# Patient Record
Sex: MALE | Race: WHITE | NOT HISPANIC OR LATINO | URBAN - METROPOLITAN AREA
[De-identification: names, ages, dates, MRNs, and addresses within clinical notes are randomized per-mention and may not be internally consistent; named-entity substitution may affect disease eponyms.]

---

## 2023-08-20 ENCOUNTER — HOSPITAL ENCOUNTER (OUTPATIENT)
Dept: DATA CONVERSION | Facility: HOSPITAL | Age: 53
Discharge: HOME | End: 2023-08-20

## 2023-08-20 DIAGNOSIS — R22.43 LOCALIZED SWELLING, MASS AND LUMP, LOWER LIMB, BILATERAL: ICD-10-CM

## 2023-08-20 DIAGNOSIS — M79.661 PAIN IN RIGHT LOWER LEG: ICD-10-CM

## 2023-08-20 DIAGNOSIS — M79.662 PAIN IN LEFT LOWER LEG: ICD-10-CM

## 2023-08-20 LAB
ALBUMIN SERPL-MCNC: 3.7 GM/DL (ref 3.5–5)
ALBUMIN/GLOB SERPL: 1.4 RATIO (ref 1.5–3)
ALP BLD-CCNC: 62 U/L (ref 35–125)
ALT SERPL-CCNC: 36 U/L (ref 5–40)
ANION GAP SERPL CALCULATED.3IONS-SCNC: 11 MMOL/L (ref 0–19)
AST SERPL-CCNC: 28 U/L (ref 5–40)
BASOPHILS # BLD AUTO: 0.04 K/UL (ref 0–0.22)
BASOPHILS NFR BLD AUTO: 0.7 % (ref 0–1)
BILIRUB SERPL-MCNC: 0.2 MG/DL (ref 0.1–1.2)
BUN SERPL-MCNC: 20 MG/DL (ref 8–25)
BUN/CREAT SERPL: 20 RATIO (ref 8–21)
CALCIUM SERPL-MCNC: 8.7 MG/DL (ref 8.5–10.4)
CHLORIDE SERPL-SCNC: 104 MMOL/L (ref 97–107)
CO2 SERPL-SCNC: 22 MMOL/L (ref 24–31)
CREAT SERPL-MCNC: 1 MG/DL (ref 0.4–1.6)
DEPRECATED RDW RBC AUTO: 41.8 FL (ref 37–54)
DIFFERENTIAL METHOD BLD: ABNORMAL
EOSINOPHIL # BLD AUTO: 0.18 K/UL (ref 0–0.45)
EOSINOPHIL NFR BLD: 2.9 % (ref 0–3)
ERYTHROCYTE [DISTWIDTH] IN BLOOD BY AUTOMATED COUNT: 13 % (ref 11.7–15)
GFR SERPL CREATININE-BSD FRML MDRD: 91 ML/MIN/1.73 M2
GLOBULIN SER-MCNC: 2.6 G/DL (ref 1.9–3.7)
GLUCOSE SERPL-MCNC: 132 MG/DL (ref 65–99)
HCT VFR BLD AUTO: 39.6 % (ref 41–50)
HGB BLD-MCNC: 13 GM/DL (ref 13.5–16.5)
HS TROPONIN T DELTA: NORMAL (ref 0–4)
IMM GRANULOCYTES # BLD AUTO: 0.08 K/UL (ref 0–0.1)
LYMPHOCYTES # BLD AUTO: 2.28 K/UL (ref 1.2–3.2)
LYMPHOCYTES NFR BLD MANUAL: 37.2 % (ref 20–40)
MCH RBC QN AUTO: 28.9 PG (ref 26–34)
MCHC RBC AUTO-ENTMCNC: 32.8 % (ref 31–37)
MCV RBC AUTO: 88 FL (ref 80–100)
MONOCYTES # BLD AUTO: 0.52 K/UL (ref 0–0.8)
MONOCYTES NFR BLD MANUAL: 8.5 % (ref 0–8)
NEUTROPHILS # BLD AUTO: 3.03 K/UL
NEUTROPHILS # BLD AUTO: 3.03 K/UL (ref 1.8–7.7)
NEUTROPHILS.IMMATURE NFR BLD: 1.3 % (ref 0–1)
NEUTS SEG NFR BLD: 49.4 % (ref 50–70)
NRBC BLD-RTO: 0 /100 WBC
NT-PROBNP SERPL-MCNC: 92 PG/ML (ref 0–138)
PLATELET # BLD AUTO: 264 K/UL (ref 150–450)
PMV BLD AUTO: 9.7 CU (ref 7–12.6)
POTASSIUM SERPL-SCNC: 4.2 MMOL/L (ref 3.4–5.1)
PROT SERPL-MCNC: 6.3 G/DL (ref 5.9–7.9)
RBC # BLD AUTO: 4.5 M/UL (ref 4.5–5.5)
SODIUM SERPL-SCNC: 137 MMOL/L (ref 133–145)
TROPONIN T SERPL-MCNC: 9 NG/L
WBC # BLD AUTO: 6.1 K/UL (ref 4.5–11)

## 2024-09-29 ENCOUNTER — OFFICE VISIT (OUTPATIENT)
Dept: URGENT CARE | Age: 54
End: 2024-09-29
Payer: MEDICARE

## 2024-09-29 VITALS
DIASTOLIC BLOOD PRESSURE: 88 MMHG | RESPIRATION RATE: 16 BRPM | SYSTOLIC BLOOD PRESSURE: 129 MMHG | HEART RATE: 83 BPM | TEMPERATURE: 97.7 F | OXYGEN SATURATION: 96 %

## 2024-09-29 DIAGNOSIS — J06.9 VIRAL URI: Primary | ICD-10-CM

## 2024-09-29 DIAGNOSIS — J01.00 ACUTE MAXILLARY SINUSITIS, RECURRENCE NOT SPECIFIED: ICD-10-CM

## 2024-09-29 DIAGNOSIS — R53.83 NO ENERGY: ICD-10-CM

## 2024-09-29 LAB — POC FINGERSTICK BLOOD GLUCOSE: 114 MG/DL (ref 70–100)

## 2024-09-29 RX ORDER — AMOXICILLIN 875 MG/1
875 TABLET, FILM COATED ORAL 2 TIMES DAILY
Qty: 14 TABLET | Refills: 0 | Status: SHIPPED | OUTPATIENT
Start: 2024-09-29 | End: 2024-10-06

## 2024-09-29 NOTE — PATIENT INSTRUCTIONS
You have maxillary sinusitis. This can be a bacterial or viral infection. Based on your history and the clinical exam I am treating this as a bacterial infection.  Please increase your oral fluids for the next 7-10 days  Please take amoxicillin As prescribed  May use Mucinex as per label instructions for nasal congestion  You may use nasal saline drops for relief of congestion as needed  May take Tylenol (acetaminophen) 325 mg, 2 tablets by mouth every 4-6 hours as needed for fever or discomfort.   May take Motrin or Advil (ibuprofen) 200 mg, 2 tablets by mouth every 8 hours as needed for fever   If no improvement in 5-7 days please follow-up with your primary care provider  If fever greater than 102 degrees Fahrenheit, chills, nausea, vomiting, increased redness, tenderness, pain over for head or cheek bone areas, bloody nasal discharge, increased difficulty breathing, difficulty swallowing, increased wheezing, shortness of breath please go immediately to emergency room for further evaluation  This note was generated by voice recognition software. Minor transcription/grammatical errors may be present. Please call for clarification.

## 2024-09-29 NOTE — PROGRESS NOTES
"Subjective   Patient ID: Kt Weaver is a 54 y.o. male.    54-year-old male presents with a 4-day history of feeling tired with no energy increased fatigue also nausea that \"comes and goes\" and with sweats and chills.    Reports he is a newly diagnosed type II diabetic with bipolar disorder.      History provided by:  Patient   used: No        The following portions of the chart were reviewed this encounter and updated as appropriate:  Allergies  Meds  Problems  Med Hx  Surg Hx  Fam Hx         Review of Systems   Constitutional:  Positive for chills. Negative for fever.   HENT:  Positive for congestion, sinus pressure and sore throat. Negative for ear pain and rhinorrhea.    Respiratory:  Positive for cough, shortness of breath and wheezing. Negative for chest tightness.    Cardiovascular:  Negative for palpitations.   Gastrointestinal:  Positive for constipation and nausea. Negative for abdominal pain, diarrhea and vomiting.   Genitourinary:  Negative for dysuria and frequency.   Neurological:  Positive for headaches.   Hematological:  Negative for adenopathy.     Objective   Vital signs are reviewed. Alert and oriented x3 with normal mood and affect  Patient is well nourished, well-developed, alert and in no acute distress  Tender to palpation over maxillary sinus areas    External eyes, orbits, conjunctiva and eyelids are normal in appearance  Pupils are equal, round, reactive to light and accommodation, extraocular movements intact    External ears appear normal  External canals are normal in appearance  Right tympanic membrane is intact and pale gray in appearance  Left tympanic membrane is intact and pale gray in appearance  There is no middle ear effusion noted on the right  There is no middle ear effusion noted on the left  External appearance of the nose is normal  Nasal mucosa, septum, turbinates are moderately swollen and reddened in appearance  There is thick yellow nasal " discharge in both nares    Oral mucosa is uniformly pink and moist  Palate is pink, symmetric and intact  Tongue is moist, mobile and midline  Posterior pharynx moderately erythematous with no concretions or exudates present  No cervical lymphadenopathy palpated    Heart has regular rate and rhythm. No murmurs, rubs or gallops are auscultated at this exam.    Respiratory rate rhythm and effort are normal. Breath sounds coarse are clear on auscultation without crackles, rhonchi, wheezes or friction rub.    Abdomen: Normal bowel sounds on auscultation. Soft, nontender without rebound or rigidity on palpation          Procedures    Assessment/Plan   Diagnoses and all orders for this visit:  Viral URI  No energy  -     POCT fingerstick glucose manually resulted  Acute maxillary sinusitis, recurrence not specified  -     amoxicillin (Amoxil) 875 mg tablet; Take 1 tablet (875 mg) by mouth 2 times a day for 7 days.    Patient disposition: Home

## 2024-10-10 ASSESSMENT — ENCOUNTER SYMPTOMS
VOMITING: 0
SHORTNESS OF BREATH: 1
CHILLS: 1
ABDOMINAL PAIN: 0
CHEST TIGHTNESS: 0
CONSTIPATION: 1
FREQUENCY: 0
FEVER: 0
SINUS PRESSURE: 1
NAUSEA: 1
HEADACHES: 1
COUGH: 1
DIARRHEA: 0
WHEEZING: 1
RHINORRHEA: 0
ADENOPATHY: 0
PALPITATIONS: 0
SORE THROAT: 1
DYSURIA: 0

## 2024-12-18 ENCOUNTER — HOSPITAL ENCOUNTER (EMERGENCY)
Facility: HOSPITAL | Age: 54
Discharge: HOME | End: 2024-12-18
Attending: EMERGENCY MEDICINE
Payer: MEDICARE

## 2024-12-18 ENCOUNTER — CLINICAL SUPPORT (OUTPATIENT)
Dept: URGENT CARE | Age: 54
End: 2024-12-18
Payer: MEDICARE

## 2024-12-18 ENCOUNTER — APPOINTMENT (OUTPATIENT)
Dept: RADIOLOGY | Facility: HOSPITAL | Age: 54
End: 2024-12-18
Payer: MEDICARE

## 2024-12-18 VITALS
OXYGEN SATURATION: 97 % | RESPIRATION RATE: 17 BRPM | BODY MASS INDEX: 33.34 KG/M2 | HEIGHT: 68 IN | SYSTOLIC BLOOD PRESSURE: 126 MMHG | TEMPERATURE: 97.9 F | DIASTOLIC BLOOD PRESSURE: 94 MMHG | HEART RATE: 94 BPM | WEIGHT: 220 LBS

## 2024-12-18 DIAGNOSIS — R10.84 GENERALIZED ABDOMINAL PAIN: ICD-10-CM

## 2024-12-18 DIAGNOSIS — K59.00 CONSTIPATION, UNSPECIFIED CONSTIPATION TYPE: Primary | ICD-10-CM

## 2024-12-18 DIAGNOSIS — Z76.89 REFERRAL OF PATIENT: Primary | ICD-10-CM

## 2024-12-18 LAB
ALBUMIN SERPL BCP-MCNC: 4 G/DL (ref 3.4–5)
ALP SERPL-CCNC: 60 U/L (ref 33–120)
ALT SERPL W P-5'-P-CCNC: 19 U/L (ref 10–52)
ANION GAP SERPL CALCULATED.3IONS-SCNC: 10 MMOL/L (ref 10–20)
APPEARANCE UR: CLEAR
AST SERPL W P-5'-P-CCNC: 15 U/L (ref 9–39)
BASOPHILS # BLD AUTO: 0.05 X10*3/UL (ref 0–0.1)
BASOPHILS NFR BLD AUTO: 0.6 %
BILIRUB SERPL-MCNC: 0.4 MG/DL (ref 0–1.2)
BILIRUB UR STRIP.AUTO-MCNC: NEGATIVE MG/DL
BUN SERPL-MCNC: 14 MG/DL (ref 6–23)
CALCIUM SERPL-MCNC: 9.1 MG/DL (ref 8.6–10.3)
CHLORIDE SERPL-SCNC: 100 MMOL/L (ref 98–107)
CO2 SERPL-SCNC: 28 MMOL/L (ref 21–32)
COLOR UR: COLORLESS
CREAT SERPL-MCNC: 0.95 MG/DL (ref 0.5–1.3)
EGFRCR SERPLBLD CKD-EPI 2021: >90 ML/MIN/1.73M*2
EOSINOPHIL # BLD AUTO: 0.2 X10*3/UL (ref 0–0.7)
EOSINOPHIL NFR BLD AUTO: 2.6 %
ERYTHROCYTE [DISTWIDTH] IN BLOOD BY AUTOMATED COUNT: 14.7 % (ref 11.5–14.5)
GLUCOSE SERPL-MCNC: 118 MG/DL (ref 74–99)
GLUCOSE UR STRIP.AUTO-MCNC: ABNORMAL MG/DL
HCT VFR BLD AUTO: 41.5 % (ref 41–52)
HGB BLD-MCNC: 13.5 G/DL (ref 13.5–17.5)
HOLD SPECIMEN: NORMAL
IMM GRANULOCYTES # BLD AUTO: 0.05 X10*3/UL (ref 0–0.7)
IMM GRANULOCYTES NFR BLD AUTO: 0.6 % (ref 0–0.9)
KETONES UR STRIP.AUTO-MCNC: NEGATIVE MG/DL
LEUKOCYTE ESTERASE UR QL STRIP.AUTO: NEGATIVE
LIPASE SERPL-CCNC: 12 U/L (ref 9–82)
LYMPHOCYTES # BLD AUTO: 2.86 X10*3/UL (ref 1.2–4.8)
LYMPHOCYTES NFR BLD AUTO: 36.9 %
MCH RBC QN AUTO: 27.8 PG (ref 26–34)
MCHC RBC AUTO-ENTMCNC: 32.5 G/DL (ref 32–36)
MCV RBC AUTO: 86 FL (ref 80–100)
MONOCYTES # BLD AUTO: 0.71 X10*3/UL (ref 0.1–1)
MONOCYTES NFR BLD AUTO: 9.1 %
NEUTROPHILS # BLD AUTO: 3.89 X10*3/UL (ref 1.2–7.7)
NEUTROPHILS NFR BLD AUTO: 50.2 %
NITRITE UR QL STRIP.AUTO: NEGATIVE
NRBC BLD-RTO: 0 /100 WBCS (ref 0–0)
PH UR STRIP.AUTO: 5.5 [PH]
PLATELET # BLD AUTO: 252 X10*3/UL (ref 150–450)
POTASSIUM SERPL-SCNC: 3.3 MMOL/L (ref 3.5–5.3)
PROT SERPL-MCNC: 7.1 G/DL (ref 6.4–8.2)
PROT UR STRIP.AUTO-MCNC: ABNORMAL MG/DL
RBC # BLD AUTO: 4.85 X10*6/UL (ref 4.5–5.9)
RBC # UR STRIP.AUTO: NEGATIVE /UL
RBC #/AREA URNS AUTO: NORMAL /HPF
SODIUM SERPL-SCNC: 135 MMOL/L (ref 136–145)
SP GR UR STRIP.AUTO: >1.05
UROBILINOGEN UR STRIP.AUTO-MCNC: NORMAL MG/DL
WBC # BLD AUTO: 7.8 X10*3/UL (ref 4.4–11.3)
WBC #/AREA URNS AUTO: NORMAL /HPF

## 2024-12-18 PROCEDURE — 96361 HYDRATE IV INFUSION ADD-ON: CPT

## 2024-12-18 PROCEDURE — 96374 THER/PROPH/DIAG INJ IV PUSH: CPT | Mod: 59

## 2024-12-18 PROCEDURE — 74177 CT ABD & PELVIS W/CONTRAST: CPT | Performed by: RADIOLOGY

## 2024-12-18 PROCEDURE — 36415 COLL VENOUS BLD VENIPUNCTURE: CPT | Performed by: EMERGENCY MEDICINE

## 2024-12-18 PROCEDURE — 74177 CT ABD & PELVIS W/CONTRAST: CPT

## 2024-12-18 PROCEDURE — 2500000004 HC RX 250 GENERAL PHARMACY W/ HCPCS (ALT 636 FOR OP/ED): Performed by: EMERGENCY MEDICINE

## 2024-12-18 PROCEDURE — 80053 COMPREHEN METABOLIC PANEL: CPT | Performed by: EMERGENCY MEDICINE

## 2024-12-18 PROCEDURE — 2550000001 HC RX 255 CONTRASTS: Performed by: EMERGENCY MEDICINE

## 2024-12-18 PROCEDURE — 81001 URINALYSIS AUTO W/SCOPE: CPT | Performed by: EMERGENCY MEDICINE

## 2024-12-18 PROCEDURE — 2500000001 HC RX 250 WO HCPCS SELF ADMINISTERED DRUGS (ALT 637 FOR MEDICARE OP): Performed by: PHYSICIAN ASSISTANT

## 2024-12-18 PROCEDURE — 85025 COMPLETE CBC W/AUTO DIFF WBC: CPT | Performed by: EMERGENCY MEDICINE

## 2024-12-18 PROCEDURE — 99285 EMERGENCY DEPT VISIT HI MDM: CPT | Mod: 25 | Performed by: EMERGENCY MEDICINE

## 2024-12-18 PROCEDURE — 83690 ASSAY OF LIPASE: CPT | Performed by: EMERGENCY MEDICINE

## 2024-12-18 RX ORDER — DOCUSATE SODIUM 100 MG/1
100 CAPSULE, LIQUID FILLED ORAL EVERY 12 HOURS
Qty: 14 CAPSULE | Refills: 0 | Status: SHIPPED | OUTPATIENT
Start: 2024-12-18 | End: 2024-12-25

## 2024-12-18 RX ORDER — GLYCERIN 1 G/1
1 SUPPOSITORY RECTAL DAILY PRN
Qty: 12 SUPPOSITORY | Refills: 0 | Status: SHIPPED | OUTPATIENT
Start: 2024-12-18 | End: 2024-12-28

## 2024-12-18 RX ORDER — SENNOSIDES 8.6 MG/1
1 TABLET ORAL ONCE
Status: COMPLETED | OUTPATIENT
Start: 2024-12-18 | End: 2024-12-18

## 2024-12-18 RX ORDER — POLYETHYLENE GLYCOL 3350 17 G/17G
17 POWDER, FOR SOLUTION ORAL DAILY
Qty: 7 PACKET | Refills: 0 | Status: SHIPPED | OUTPATIENT
Start: 2024-12-18 | End: 2024-12-25

## 2024-12-18 RX ORDER — KETOROLAC TROMETHAMINE 30 MG/ML
15 INJECTION, SOLUTION INTRAMUSCULAR; INTRAVENOUS ONCE
Status: COMPLETED | OUTPATIENT
Start: 2024-12-18 | End: 2024-12-18

## 2024-12-18 ASSESSMENT — LIFESTYLE VARIABLES
EVER FELT BAD OR GUILTY ABOUT YOUR DRINKING: NO
HAVE YOU EVER FELT YOU SHOULD CUT DOWN ON YOUR DRINKING: NO
HAVE PEOPLE ANNOYED YOU BY CRITICIZING YOUR DRINKING: NO
TOTAL SCORE: 0
EVER HAD A DRINK FIRST THING IN THE MORNING TO STEADY YOUR NERVES TO GET RID OF A HANGOVER: NO

## 2024-12-18 ASSESSMENT — COLUMBIA-SUICIDE SEVERITY RATING SCALE - C-SSRS
2. HAVE YOU ACTUALLY HAD ANY THOUGHTS OF KILLING YOURSELF?: NO
1. IN THE PAST MONTH, HAVE YOU WISHED YOU WERE DEAD OR WISHED YOU COULD GO TO SLEEP AND NOT WAKE UP?: NO
6. HAVE YOU EVER DONE ANYTHING, STARTED TO DO ANYTHING, OR PREPARED TO DO ANYTHING TO END YOUR LIFE?: NO

## 2024-12-18 NOTE — ED PROVIDER NOTES
HPI   Chief Complaint   Patient presents with    Constipation       54-year-old male presented emergency department the chief complaint of abdominal pain, constipation.  Has not had a bowel movement in several days.  States he feels like he has pain and has some pain when he bears down.  History of colonic cancer history of bowel resection.  Well-appearing nontoxic afebrile, no vomiting.  Denies lightheadedness dizziness numbness weakness.  Well-appearing nontoxic afebrile.  No other complaint.              Patient History   Past Medical History:   Diagnosis Date    Migraine, unspecified, not intractable, without status migrainosus     Migraines    Personal history of other malignant neoplasm of large intestine     History of malignant neoplasm of colon    Personal history of other venous thrombosis and embolism     H/O blood clots     Past Surgical History:   Procedure Laterality Date    OTHER SURGICAL HISTORY  12/29/2020    Cholecystectomy    OTHER SURGICAL HISTORY  12/29/2020    Colectomy    OTHER SURGICAL HISTORY  12/29/2020    Appendectomy     No family history on file.  Social History     Tobacco Use    Smoking status: Unknown    Smokeless tobacco: Not on file   Substance Use Topics    Alcohol use: Not on file    Drug use: Not on file       Physical Exam   ED Triage Vitals [12/18/24 1029]   Temperature Heart Rate Respirations BP   36.6 °C (97.9 °F) 94 17 (!) 126/94      Pulse Ox Temp Source Heart Rate Source Patient Position   97 % Temporal Monitor Sitting      BP Location FiO2 (%)     Left arm --       Physical Exam  Vitals and nursing note reviewed.   Constitutional:       Appearance: Normal appearance.   HENT:      Head: Normocephalic.      Nose: Nose normal.      Mouth/Throat:      Mouth: Mucous membranes are moist.   Cardiovascular:      Rate and Rhythm: Normal rate.      Pulses: Normal pulses.   Pulmonary:      Effort: Pulmonary effort is normal.   Abdominal:      General: Abdomen is flat.      Comments:  Lower abdominal tenderness, soft no rebound or guarding   Musculoskeletal:         General: Normal range of motion.      Cervical back: Normal range of motion.   Skin:     General: Skin is warm.   Neurological:      General: No focal deficit present.      Mental Status: He is alert and oriented to person, place, and time.   Psychiatric:         Mood and Affect: Mood normal.           ED Course & MDM   Diagnoses as of 12/18/24 2336   Constipation, unspecified constipation type   Generalized abdominal pain                 No data recorded     [place] Coma Scale Score: 15 (12/18/24 1057 : Dolores Gandhi RN)                           Medical Decision Making  I have seen and evaluated this patient.  The attending physician has also seen and evaluated this patient.  Vital signs, laboratory testing and diagnostic images if applicable have been reviewed.  All laboratory and imaging is interpreted by myself unless otherwise stated.  Radiology studies are also formally interpreted by radiologist.    CBC without significant leukocytosis or anemia, metabolic panel without significant renal impairment or electrolyte abnormality.  CT scan with evidence of constipation no evidence of obstruction.  Patient placed on bowel regimen with outpatient gastroenterology referral.  Released in stable condition from emergent standpoint.    Labs Reviewed  CBC WITH AUTO DIFFERENTIAL - Abnormal     WBC                           7.8                    nRBC                          0.0                    RBC                           4.85                   Hemoglobin                    13.5                   Hematocrit                    41.5                   MCV                           86                     MCH                           27.8                   MCHC                          32.5                   RDW                           14.7 (*)               Platelets                     252                    Neutrophils %                  50.2                   Immature Granulocytes %, Automated   0.6                    Lymphocytes %                 36.9                   Monocytes %                   9.1                    Eosinophils %                 2.6                    Basophils %                   0.6                    Neutrophils Absolute          3.89                   Immature Granulocytes Absolute, Au*   0.05                   Lymphocytes Absolute          2.86                   Monocytes Absolute            0.71                   Eosinophils Absolute          0.20                   Basophils Absolute            0.05                COMPREHENSIVE METABOLIC PANEL - Abnormal     Glucose                       118 (*)                Sodium                        135 (*)                Potassium                     3.3 (*)                Chloride                      100                    Bicarbonate                   28                     Anion Gap                     10                     Urea Nitrogen                 14                     Creatinine                    0.95                   eGFR                          >90                    Calcium                       9.1                    Albumin                       4.0                    Alkaline Phosphatase          60                     Total Protein                 7.1                    AST                           15                     Bilirubin, Total              0.4                    ALT                           19                  URINALYSIS WITH REFLEX CULTURE AND MICROSCOPIC - Abnormal     Color, Urine                  Colorless (*)               Appearance, Urine             Clear                  Specific Gravity, Urine       >1.050 (*)               pH, Urine                     5.5                    Protein, Urine                10 (TRACE)                Glucose, Urine                OVER (4+) (*)               Blood, Urine                  NEGATIVE                 Ketones, Urine                NEGATIVE                Bilirubin, Urine              NEGATIVE                Urobilinogen, Urine           Normal                 Nitrite, Urine                NEGATIVE                Leukocyte Esterase, Urine     NEGATIVE                  Narrative: OVER is reported when the result is greater than the clinically reportable range.  LIPASE - Normal     Lipase                        12                       Narrative: Venipuncture immediately after or during the administration of Metamizole may lead to falsely low results. Testing should be performed immediately prior to Metamizole dosing.  URINALYSIS MICROSCOPIC WITH REFLEX CULTURE - Normal     WBC, Urine                    NONE                   RBC, Urine                    NONE                URINALYSIS WITH REFLEX CULTURE AND MICROSCOPIC       Narrative: The following orders were created for panel order Urinalysis with Reflex Culture and Microscopic.                Procedure                               Abnormality         Status                                   ---------                               -----------         ------                                   Urinalysis with Reflex C...[[id number]]  Abnormal            Final result                             Extra Urine Gray Tube[[id number]]                            Final result                                             Please view results for these tests on the individual orders.  EXTRA URINE GRAY TUBE  CT abdomen pelvis w IV contrast   Final Result    1.  Moderate to large colonic stool burden. Focal dilatation of the    distal colon near a suspected anastomosis, likely a postsurgical    change.    2. Otherwise no acute findings of the abdomen or pelvis identified.          MACRO:    None.          Signed by: Carlo Agee 12/18/2024 12:43 PM    Dictation workstation:   [id number]     Medications  ketorolac (Toradol) injection 15 mg (15 mg intravenous Given  12/18/24 1118)  sodium chloride 0.9 % bolus 500 mL (0 mL intravenous Stopped 12/18/24 1424)  iohexol (OMNIPaque) 350 mg iodine/mL solution 75 mL (75 mL intravenous Given 12/18/24 1208)  sennosides (Senokot) tablet 8.6 mg (8.6 mg oral Given 12/18/24 1437)  Discharge Medication List as of 12/18/2024  1:54 PM    START taking these medications    docusate sodium (Colace) 100 mg capsule  Take 1 capsule (100 mg) by mouth every 12 hours for 7 days., Starting Wed 12/18/2024, Until Wed 12/25/2024, Normal    glycerin (Adult) 2 g suppository  Insert 1 suppository into the rectum once daily as needed for constipation for up to 10 days., Starting Wed 12/18/2024, Until Sat 12/28/2024 at 2359, Normal    polyethylene glycol (Glycolax, Miralax) 17 gram packet  Take 17 g by mouth once daily for 7 doses., Starting Wed 12/18/2024, Until Wed 12/25/2024, Normal                  Procedure  Procedures     Mello Wong PA-C  12/18/24 0625

## 2024-12-18 NOTE — ED TRIAGE NOTES
Constipation. Went a little last night and thismorning. But has a hx of colon cancer that gave him stomach issues

## 2025-01-29 ENCOUNTER — HOSPITAL ENCOUNTER (OUTPATIENT)
Dept: RADIOLOGY | Facility: HOSPITAL | Age: 55
Discharge: HOME | End: 2025-01-29
Payer: MEDICARE

## 2025-01-29 DIAGNOSIS — M47.26 OTHER SPONDYLOSIS WITH RADICULOPATHY, LUMBAR REGION: ICD-10-CM

## 2025-01-29 DIAGNOSIS — S33.5XXA SPRAIN OF LIGAMENTS OF LUMBAR SPINE, INITIAL ENCOUNTER: ICD-10-CM

## 2025-01-29 DIAGNOSIS — M51.369 OTHER INTERVERTEBRAL DISC DEGENERATION, LUMBAR REGION WITHOUT MENTION OF LUMBAR BACK PAIN OR LOWER EXTREMITY PAIN: ICD-10-CM

## 2025-01-29 DIAGNOSIS — M51.26 OTHER INTERVERTEBRAL DISC DISPLACEMENT, LUMBAR REGION: ICD-10-CM

## 2025-01-29 PROCEDURE — 72148 MRI LUMBAR SPINE W/O DYE: CPT

## 2025-02-10 ENCOUNTER — APPOINTMENT (OUTPATIENT)
Dept: RADIOLOGY | Facility: HOSPITAL | Age: 55
End: 2025-02-10
Payer: MEDICARE

## 2025-09-05 ENCOUNTER — OFFICE VISIT (OUTPATIENT)
Dept: URGENT CARE | Age: 55
End: 2025-09-05
Payer: MEDICARE

## 2025-09-05 VITALS
TEMPERATURE: 97.9 F | OXYGEN SATURATION: 97 % | DIASTOLIC BLOOD PRESSURE: 74 MMHG | RESPIRATION RATE: 18 BRPM | HEART RATE: 70 BPM | SYSTOLIC BLOOD PRESSURE: 130 MMHG

## 2025-09-05 DIAGNOSIS — H61.23 BILATERAL IMPACTED CERUMEN: Primary | ICD-10-CM
